# Patient Record
Sex: FEMALE | Race: WHITE | URBAN - METROPOLITAN AREA
[De-identification: names, ages, dates, MRNs, and addresses within clinical notes are randomized per-mention and may not be internally consistent; named-entity substitution may affect disease eponyms.]

---

## 2019-11-15 ENCOUNTER — HISTORICAL (OUTPATIENT)
Dept: RADIOLOGY | Facility: HOSPITAL | Age: 34
End: 2019-11-15

## 2021-10-04 ENCOUNTER — HISTORICAL (OUTPATIENT)
Dept: RADIOLOGY | Facility: HOSPITAL | Age: 36
End: 2021-10-04

## 2025-03-16 ENCOUNTER — RESULTS FOLLOW-UP (OUTPATIENT)
Dept: URGENT CARE | Facility: CLINIC | Age: 40
End: 2025-03-16

## 2025-03-16 ENCOUNTER — OFFICE VISIT (OUTPATIENT)
Dept: URGENT CARE | Facility: CLINIC | Age: 40
End: 2025-03-16
Payer: COMMERCIAL

## 2025-03-16 VITALS
OXYGEN SATURATION: 97 % | HEART RATE: 59 BPM | TEMPERATURE: 98 F | RESPIRATION RATE: 18 BRPM | HEIGHT: 65 IN | BODY MASS INDEX: 37.65 KG/M2 | WEIGHT: 226 LBS | SYSTOLIC BLOOD PRESSURE: 129 MMHG | DIASTOLIC BLOOD PRESSURE: 81 MMHG

## 2025-03-16 DIAGNOSIS — R09.81 CONGESTION OF NASAL SINUS: ICD-10-CM

## 2025-03-16 DIAGNOSIS — R05.9 COUGH, UNSPECIFIED TYPE: Primary | ICD-10-CM

## 2025-03-16 LAB
CTP QC/QA: YES
CTP QC/QA: YES
POC MOLECULAR INFLUENZA A AGN: NEGATIVE
POC MOLECULAR INFLUENZA B AGN: NEGATIVE
SARS CORONAVIRUS 2 ANTIGEN: NEGATIVE

## 2025-03-16 PROCEDURE — 87811 SARS-COV-2 COVID19 W/OPTIC: CPT | Mod: QW,,, | Performed by: NUTRITIONIST

## 2025-03-16 PROCEDURE — 99203 OFFICE O/P NEW LOW 30 MIN: CPT | Mod: ,,, | Performed by: NUTRITIONIST

## 2025-03-16 PROCEDURE — 87502 INFLUENZA DNA AMP PROBE: CPT | Mod: QW,,, | Performed by: NUTRITIONIST

## 2025-03-16 RX ORDER — ALBUTEROL SULFATE 90 UG/1
2 INHALANT RESPIRATORY (INHALATION) EVERY 6 HOURS PRN
Qty: 90 G | Refills: 0 | Status: SHIPPED | OUTPATIENT
Start: 2025-03-16 | End: 2025-04-15

## 2025-03-16 RX ORDER — AZELASTINE 1 MG/ML
1 SPRAY, METERED NASAL 2 TIMES DAILY
Qty: 30 ML | Refills: 0 | Status: SHIPPED | OUTPATIENT
Start: 2025-03-16 | End: 2026-03-16

## 2025-03-16 RX ORDER — PREDNISONE 20 MG/1
20 TABLET ORAL 2 TIMES DAILY
Qty: 10 TABLET | Refills: 0 | Status: SHIPPED | OUTPATIENT
Start: 2025-03-16 | End: 2025-03-21

## 2025-03-16 NOTE — PATIENT INSTRUCTIONS
Cough  Congestion    Flu: Negative  COVID: Negative    Chest x-ray done today in clinic:  Final report shows no cardiopulmonary abnormalities.  X-ray was normal.    I will be treating you for an upper respiratory infection.  Usually URIs are viral in etiology, so we will be treating your symptoms at this time.        Medication will be sent to pharmacy.    Drink plenty of fluids.     Get plenty of rest.     Tylenol or Motrin as needed.     You may start an over-the-counter antihistamines such as Zyrtec, Claritin or Allegra.    Go to the ER with any significant change or worsening of symptoms.     Follow up with your primary care doctor.

## 2025-03-16 NOTE — PROGRESS NOTES
"Subjective:      Patient ID: Myles Trevino is a 39 y.o. female.    Vitals:  height is 5' 5" (1.651 m) and weight is 102.5 kg (226 lb). Her oral temperature is 98.3 °F (36.8 °C). Her blood pressure is 129/81 and her pulse is 59 (abnormal). Her respiration is 18 and oxygen saturation is 97%.     Chief Complaint: Cough     Patient is a 39 y.o. female who presents to urgent care with complaints of earache, congestion, cough  x 2 days. Alleviating factors include OTC medication with mild amount of relief. Patient denies fever.  Patient reports allergy to aspirin.  Some chest tightness  Denies shortness of breath, dyspnea on exertion and palpitations.        HENT:  Positive for ear pain (Right ear) and congestion. Negative for ear discharge, foreign body in ear, tinnitus, hearing loss, drooling, facial swelling, sore throat and trouble swallowing.    Neck: Negative for neck pain and neck stiffness.   Cardiovascular:  Negative for chest pain.   Respiratory:  Positive for chest tightness and cough. Negative for bloody sputum, shortness of breath and wheezing.    Gastrointestinal:  Negative for abdominal pain, vomiting and diarrhea.   Skin:  Negative for rash.      Objective:        Previous History      Review of patient's allergies indicates:   Allergen Reactions    Aspirin        History reviewed. No pertinent past medical history.  Current Outpatient Medications   Medication Instructions    albuterol (VENTOLIN HFA) 90 mcg/actuation inhaler 2 puffs, Inhalation, Every 6 hours PRN, Rescue    azelastine (ASTELIN) 137 mcg, Nasal, 2 times daily    predniSONE (DELTASONE) 20 mg, Oral, 2 times daily    pyrilamine-chlophedianoL 12.5-12.5 mg/5 mL Liqd 10 mLs, Oral, 3 times daily     Past Surgical History:   Procedure Laterality Date    ADENOIDECTOMY      NOSE SURGERY      TONSILLECTOMY       Family History   Problem Relation Name Age of Onset    Hyperlipidemia Mother      No Known Problems Father      No Known Problems Sister   " "   No Known Problems Brother         Social History[1]     Physical Exam      Vital Signs Reviewed   /81   Pulse (!) 59   Temp 98.3 °F (36.8 °C) (Oral)   Resp 18   Ht 5' 5" (1.651 m)   Wt 102.5 kg (226 lb)   LMP 03/02/2025   SpO2 97%   BMI 37.61 kg/m²        Procedures    Procedures     Labs     Results for orders placed or performed in visit on 03/16/25   SARS Coronavirus 2 Antigen, POCT Manual Read    Collection Time: 03/16/25  9:21 AM   Result Value Ref Range    SARS Coronavirus 2 Antigen Negative Negative, Presumptive Negative     Acceptable Yes    POCT Influenza A/B Molecular    Collection Time: 03/16/25  9:21 AM   Result Value Ref Range    POC Molecular Influenza A Ag Negative Negative    POC Molecular Influenza B Ag Negative Negative     Acceptable Yes        Physical Exam   Constitutional: She appears well-developed.  Non-toxic appearance. She does not appear ill. No distress.   HENT:   Head: Atraumatic.   Ears:   Left Ear: Tympanic membrane, external ear and ear canal normal.      Comments: Erythema to right tympanic membrane.  Nose: Congestion present. No purulent discharge. Right sinus exhibits no maxillary sinus tenderness and no frontal sinus tenderness. Left sinus exhibits no maxillary sinus tenderness and no frontal sinus tenderness.   Mouth/Throat: Uvula is midline. Mucous membranes are moist. Posterior oropharyngeal erythema present.   Eyes: Right eye exhibits no discharge. Left eye exhibits no discharge. Extraocular movement intact   Neck: Neck supple. No neck rigidity present.   Cardiovascular: Normal rate, regular rhythm, normal heart sounds and normal pulses.   Pulmonary/Chest: Effort normal. No respiratory distress. She has wheezes (Expiratory wheezes in bilateral lung fields). She has no rhonchi. She has no rales.   Lymphadenopathy:     She has no cervical adenopathy.   Neurological: She is alert.   Skin: Skin is warm, dry and not diaphoretic. "   Psychiatric: Her behavior is normal.   Nursing note and vitals reviewed.      Assessment:     1. Cough, unspecified type    2. Congestion of nasal sinus        Plan:     Cough  Congestion     Flu: Negative  COVID: Negative     Chest x-ray done today in clinic:  Final report shows no cardiopulmonary abnormalities.  X-ray was normal.     I will be treating you for an upper respiratory infection.  Usually URIs are viral in etiology, so we will be treating your symptoms at this time.          Medication will be sent to pharmacy.     Drink plenty of fluids.      Get plenty of rest.      Tylenol or Motrin as needed.      You may start an over-the-counter antihistamines such as Zyrtec, Claritin or Allegra.     Go to the ER with any significant change or worsening of symptoms.      Follow up with your primary care doctor.                    Cough, unspecified type  -     POCT Influenza A/B Molecular  -     X-Ray Chest PA And Lateral; Future; Expected date: 03/16/2025    Congestion of nasal sinus  -     SARS Coronavirus 2 Antigen, POCT Manual Read  -     X-Ray Chest PA And Lateral; Future; Expected date: 03/16/2025    Other orders  -     predniSONE (DELTASONE) 20 MG tablet; Take 1 tablet (20 mg total) by mouth 2 (two) times daily. for 5 days  Dispense: 10 tablet; Refill: 0  -     albuterol (VENTOLIN HFA) 90 mcg/actuation inhaler; Inhale 2 puffs into the lungs every 6 (six) hours as needed for Shortness of Breath. Rescue  Dispense: 90 g; Refill: 0  -     azelastine (ASTELIN) 137 mcg (0.1 %) nasal spray; 1 spray (137 mcg total) by Nasal route 2 (two) times daily.  Dispense: 30 mL; Refill: 0  -     pyrilamine-chlophedianoL 12.5-12.5 mg/5 mL Liqd; Take 10 mLs by mouth 3 (three) times daily.  Dispense: 180 mL; Refill: 0                         [1]   Social History  Tobacco Use    Smoking status: Every Day     Current packs/day: 1.00     Types: Cigarettes    Smokeless tobacco: Current   Substance Use Topics    Alcohol use: Never